# Patient Record
Sex: FEMALE | Race: BLACK OR AFRICAN AMERICAN
[De-identification: names, ages, dates, MRNs, and addresses within clinical notes are randomized per-mention and may not be internally consistent; named-entity substitution may affect disease eponyms.]

---

## 2021-10-07 ENCOUNTER — HOSPITAL ENCOUNTER (EMERGENCY)
Dept: HOSPITAL 76 - ED | Age: 21
Discharge: HOME | End: 2021-10-07
Payer: COMMERCIAL

## 2021-10-07 VITALS — DIASTOLIC BLOOD PRESSURE: 69 MMHG | SYSTOLIC BLOOD PRESSURE: 132 MMHG

## 2021-10-07 DIAGNOSIS — R55: Primary | ICD-10-CM

## 2021-10-07 LAB
ALBUMIN DIAFP-MCNC: 4.8 G/DL (ref 3.2–5.5)
ALBUMIN/GLOB SERPL: 1.5 {RATIO} (ref 1–2.2)
ALP SERPL-CCNC: 65 IU/L (ref 42–121)
ALT SERPL W P-5'-P-CCNC: 14 IU/L (ref 10–60)
ANION GAP SERPL CALCULATED.4IONS-SCNC: 11 MMOL/L (ref 6–13)
AST SERPL W P-5'-P-CCNC: 24 IU/L (ref 10–42)
BASOPHILS NFR BLD AUTO: 0 10^3/UL (ref 0–0.1)
BASOPHILS NFR BLD AUTO: 0.4 %
BILIRUB BLD-MCNC: 0.7 MG/DL (ref 0.2–1)
BUN SERPL-MCNC: 9 MG/DL (ref 6–20)
CALCIUM UR-MCNC: 9.9 MG/DL (ref 8.5–10.3)
CHLORIDE SERPL-SCNC: 102 MMOL/L (ref 101–111)
CLARITY UR REFRACT.AUTO: CLEAR
CO2 SERPL-SCNC: 25 MMOL/L (ref 21–32)
CREAT SERPLBLD-SCNC: 0.8 MG/DL (ref 0.4–1)
EOSINOPHIL # BLD AUTO: 0 10^3/UL (ref 0–0.7)
EOSINOPHIL NFR BLD AUTO: 0.1 %
ERYTHROCYTE [DISTWIDTH] IN BLOOD BY AUTOMATED COUNT: 13.4 % (ref 12–15)
GFRSERPLBLD MDRD-ARVRAT: 110 ML/MIN/{1.73_M2} (ref 89–?)
GLOBULIN SER-MCNC: 3.1 G/DL (ref 2.1–4.2)
GLUCOSE SERPL-MCNC: 85 MG/DL (ref 70–100)
GLUCOSE UR QL STRIP.AUTO: NEGATIVE MG/DL
HCG UR QL: NEGATIVE
HCT VFR BLD AUTO: 41.2 % (ref 37–47)
HGB UR QL STRIP: 13.2 G/DL (ref 12–16)
KETONES UR QL STRIP.AUTO: 15 MG/DL
LIPASE SERPL-CCNC: 28 U/L (ref 22–51)
LYMPHOCYTES # SPEC AUTO: 0.9 10^3/UL (ref 1.5–3.5)
LYMPHOCYTES NFR BLD AUTO: 9.4 %
MCH RBC QN AUTO: 27.8 PG (ref 27–31)
MCHC RBC AUTO-ENTMCNC: 32 G/DL (ref 32–36)
MCV RBC AUTO: 86.9 FL (ref 81–99)
MONOCYTES # BLD AUTO: 0.4 10^3/UL (ref 0–1)
MONOCYTES NFR BLD AUTO: 4.2 %
NEUTROPHILS # BLD AUTO: 8.2 10^3/UL (ref 1.5–6.6)
NEUTROPHILS # SNV AUTO: 9.6 X10^3/UL (ref 4.8–10.8)
NEUTROPHILS NFR BLD AUTO: 85.6 %
NITRITE UR QL STRIP.AUTO: NEGATIVE
NRBC # BLD AUTO: 0 /100WBC
NRBC # BLD AUTO: 0 X10^3/UL
PDW BLD AUTO: 10.1 FL (ref 7.9–10.8)
PH UR STRIP.AUTO: 5 PH (ref 5–7.5)
PLATELET # BLD: 332 10^3/UL (ref 130–450)
POTASSIUM SERPL-SCNC: 4 MMOL/L (ref 3.5–5)
PROT SPEC-MCNC: 7.9 G/DL (ref 6.7–8.2)
PROT UR STRIP.AUTO-MCNC: NEGATIVE MG/DL
RBC # UR STRIP.AUTO: (no result) /UL
RBC # URNS HPF: (no result) /HPF (ref 0–5)
RBC MAR: 4.74 10^6/UL (ref 4.2–5.4)
SODIUM SERPLBLD-SCNC: 138 MMOL/L (ref 135–145)
SP GR UR STRIP.AUTO: 1.01 (ref 1–1.03)
SQUAMOUS URNS QL MICRO: (no result)
UROBILINOGEN UR QL STRIP.AUTO: (no result) E.U./DL
UROBILINOGEN UR STRIP.AUTO-MCNC: NEGATIVE MG/DL
WBC # UR MANUAL: (no result) /HPF (ref 0–5)

## 2021-10-07 PROCEDURE — 93005 ELECTROCARDIOGRAM TRACING: CPT

## 2021-10-07 PROCEDURE — 81001 URINALYSIS AUTO W/SCOPE: CPT

## 2021-10-07 PROCEDURE — 36415 COLL VENOUS BLD VENIPUNCTURE: CPT

## 2021-10-07 PROCEDURE — 81003 URINALYSIS AUTO W/O SCOPE: CPT

## 2021-10-07 PROCEDURE — 99282 EMERGENCY DEPT VISIT SF MDM: CPT

## 2021-10-07 PROCEDURE — 80053 COMPREHEN METABOLIC PANEL: CPT

## 2021-10-07 PROCEDURE — 81025 URINE PREGNANCY TEST: CPT

## 2021-10-07 PROCEDURE — 87086 URINE CULTURE/COLONY COUNT: CPT

## 2021-10-07 PROCEDURE — 99284 EMERGENCY DEPT VISIT MOD MDM: CPT

## 2021-10-07 PROCEDURE — 85025 COMPLETE CBC W/AUTO DIFF WBC: CPT

## 2021-10-07 PROCEDURE — 83690 ASSAY OF LIPASE: CPT

## 2021-10-07 PROCEDURE — 84484 ASSAY OF TROPONIN QUANT: CPT

## 2021-10-07 NOTE — ED PHYSICIAN DOCUMENTATION
PD HPI SYNCOPE





- Stated complaint


Stated Complaint: LOSS OF CONSCOUSNESS





- Chief complaint


Chief Complaint: Neuro





- History obtained from


History obtained from: Patient





- History of Present Illness


Witnessed: Witnessed


Timing - onset: Today


Duration: Seconds


Preceding symptoms: Vision changes, Light headed, Generalized weakness


Associated symptoms: Vision changes.  No: Seizure, Incontinant of urine, 

Incontinant of stool, Headache


Contributing factors: Exertion


Injury occurred: None


Similar symptoms before: Has not had sx before


Recently seen: Not recently seen





- Additional information


Additional information: 





Previously well 20-year-old female was doing her physical activity today in 

training at the [employer], doing some rowing, when she began to feel some li

ghtheadedness and dizziness, like she was going to pass out. This improved and 

she continued to work out. When she finished her workout she felt again like she

might have some near syncope. She was able to get into her car and drive home 

and on the way home she began to feel like she was going to pass out again. She 

pulled over the side of the road. She got on the telephone with someone, that 

person told her that she seemed to pass out 3 or 4 times and the patient has no 

recollection of this.  She denies any seizure activity she denies any injury 

associated with this.  She has not had any chest pain.





Review of Systems


Constitutional: denies: Fever


Eyes: denies: Decreased vision


Ears: denies: Ear pain


Nose: denies: Congestion


Throat: denies: Sore throat


Cardiac: denies: Chest pain / pressure


Respiratory: denies: Dyspnea, Cough


GI: denies: Abdominal Pain, Nausea, Vomiting


: denies: Dysuria, Frequency


Skin: denies: Rash


Musculoskeletal: denies: Neck pain, Back pain, Extremity pain


Neurologic: reports: Near syncope.  denies: Generalized weakness, Focal 

weakness, Numbness, Difficulty speaking, Headache, Head injury, LOC





PD PAST MEDICAL HISTORY





- Allergies


Allergies/Adverse Reactions: 


                                    Allergies











Allergy/AdvReac Type Severity Reaction Status Date / Time


 


No Known Drug Allergies Allergy   Verified 10/07/21 09:20














PD ED PE NORMAL





- Vitals


Vital signs reviewed: Yes (normal )





- General


General: Alert and oriented X 3, No acute distress, Well developed/nourished





- HEENT


HEENT: Atraumatic, PERRL, EOMI





- Neck


Neck: Supple, no meningeal sign, No bony TTP





- Cardiac


Cardiac: RRR, No murmur





- Respiratory


Respiratory: No respiratory distress, Clear bilaterally





- Abdomen


Abdomen: Normal bowel sounds, Soft, Non tender, Non distended, No organomegaly





- Back


Back: No CVA TTP, No spinal TTP





- Derm


Derm: Normal color, Warm and dry, No rash





- Extremities


Extremities: No deformity, No edema





- Neuro


Neuro: Alert and oriented X 3, CNs 2-12 intact, No motor deficit, No sensory 

deficit, Normal speech


Eye Opening: Spontaneous


Motor: Obeys Commands


Verbal: Oriented


GCS Score: 15





- Psych


Psych: Normal mood, Normal affect





Results





- Vitals


Vitals: 


                               Vital Signs - 24 hr











  10/07/21





  09:15


 


Temperature 36.3 C L


 


Heart Rate 90


 


Respiratory 17





Rate 


 


Blood Pressure 132/69 H


 


O2 Saturation 99








                                     Oxygen











O2 Source                      Room air

















- EKG (time done)


  ** 1108


Rate: Rate (enter#) (68)


Rhythm: NSR


Ischemia: Normal ST segments


Compare to prior EKG: Old EKG unavailable


Computer interpretation: Agree with computer





- Labs


Labs: 


                                Laboratory Tests











  10/07/21 10/07/21 10/07/21





  10:49 10:49 10:49


 


WBC  9.6  


 


RBC  4.74  


 


Hgb  13.2  


 


Hct  41.2  


 


MCV  86.9  


 


MCH  27.8  


 


MCHC  32.0  


 


RDW  13.4  


 


Plt Count  332  


 


MPV  10.1  


 


Neut # (Auto)  8.2 H  


 


Lymph # (Auto)  0.9 L  


 


Mono # (Auto)  0.4  


 


Eos # (Auto)  0.0  


 


Baso # (Auto)  0.0  


 


Absolute Nucleated RBC  0.00  


 


Nucleated RBC %  0.0  


 


Sodium   138 


 


Potassium   4.0 


 


Chloride   102 


 


Carbon Dioxide   25 


 


Anion Gap   11.0 


 


BUN   9 


 


Creatinine   0.8 


 


Estimated GFR (MDRD)   110 


 


Glucose   85 


 


Calcium   9.9 


 


Total Bilirubin   0.7 


 


AST   24 


 


ALT   14 


 


Alkaline Phosphatase   65 


 


Troponin I High Sens    12.6


 


Total Protein   7.9 


 


Albumin   4.8 


 


Globulin   3.1 


 


Albumin/Globulin Ratio   1.5 


 


Lipase   28 


 


Urine Color   


 


Urine Clarity   


 


Urine pH   


 


Ur Specific Gravity   


 


Urine Protein   


 


Urine Glucose (UA)   


 


Urine Ketones   


 


Urine Occult Blood   


 


Urine Nitrite   


 


Urine Bilirubin   


 


Urine Urobilinogen   


 


Ur Leukocyte Esterase   


 


Urine RBC   


 


Urine WBC   


 


Ur Squamous Epith Cells   


 


Urine Bacteria   


 


Ur Microscopic Review   


 


Urine Culture Comments   


 


Urine HCG, Qual   














  10/07/21





  12:49


 


WBC 


 


RBC 


 


Hgb 


 


Hct 


 


MCV 


 


MCH 


 


MCHC 


 


RDW 


 


Plt Count 


 


MPV 


 


Neut # (Auto) 


 


Lymph # (Auto) 


 


Mono # (Auto) 


 


Eos # (Auto) 


 


Baso # (Auto) 


 


Absolute Nucleated RBC 


 


Nucleated RBC % 


 


Sodium 


 


Potassium 


 


Chloride 


 


Carbon Dioxide 


 


Anion Gap 


 


BUN 


 


Creatinine 


 


Estimated GFR (MDRD) 


 


Glucose 


 


Calcium 


 


Total Bilirubin 


 


AST 


 


ALT 


 


Alkaline Phosphatase 


 


Troponin I High Sens 


 


Total Protein 


 


Albumin 


 


Globulin 


 


Albumin/Globulin Ratio 


 


Lipase 


 


Urine Color  YELLOW


 


Urine Clarity  CLEAR


 


Urine pH  5.0


 


Ur Specific Gravity  1.015


 


Urine Protein  NEGATIVE


 


Urine Glucose (UA)  NEGATIVE


 


Urine Ketones  15 H


 


Urine Occult Blood  MODERATE H


 


Urine Nitrite  NEGATIVE


 


Urine Bilirubin  NEGATIVE


 


Urine Urobilinogen  0.2 (NORMAL)


 


Ur Leukocyte Esterase  NEGATIVE


 


Urine RBC  0-5


 


Urine WBC  0-3


 


Ur Squamous Epith Cells  RARE Squamous


 


Urine Bacteria  Rare


 


Ur Microscopic Review  INDICATED


 


Urine Culture Comments  NOT INDICATED


 


Urine HCG, Qual  NEGATIVE














- Rads (name of study)


  ** chest


Radiology: Prelim report reviewed (Impression: 1.  No acute cardiopulmonary 

abnormality.), EMP read indepedently, See rad report





Procedures





- IVC sono (time)


  ** 1030


Bedside IVC sono: IVC measures (cm) (1.51), Euvolemia





PD MEDICAL DECISION MAKING





- ED course


Complexity details: reviewed results, re-evaluated patient, considered 

differential, d/w patient


ED course: 





20-year-old female with acute near syncope after physical exertion is found to 

be euvolemic on interrogation of the inferior vena cava she has normal blood 

indices normal electrolytes normal troponin normal up appearing 

electrocardiogram and chest x-ray. She is not pregnant does look like she is 

starting her menses.





Departure





- Departure


Disposition: 01 Home, Self Care


Clinical Impression: 


Syncope


Qualifiers:


 Syncope type: unspecified Qualified Code(s): R55 - Syncope and collapse





Condition: Stable


Instructions:  ED Syncope Vasovagal


Follow-Up: 


[facility] [place] [Provider Group]

## 2021-10-07 NOTE — XRAY REPORT
PROCEDURE:  Chest 1 View X-Ray

 

INDICATIONS:  chest pain

 

TECHNIQUE:  One view of the chest was acquired.  

 

COMPARISON:  None available.

 

 

FINDINGS: 

SUPPORT DEVICES: None.

LUNG/PLEURA: No focal consolidation or pulmonary edema. No pleural effusion or space-occupying pneumo
thorax.

MEDIASTINUM: The cardiomediastinal silhouette is within normal limits.

BONES/SOFT TISSUES: No acute abnormality.

 

IMPRESSION: 

1.No acute cardiopulmonary abnormality.

 

Reviewed by: Gigi Leigh MD on 10/7/2021 10:58 AM PDT

Approved by: Gigi Leigh MD on 10/7/2021 10:58 AM PDT

 

 

Station ID:  [id number]

## 2022-10-17 ENCOUNTER — HOSPITAL ENCOUNTER (OUTPATIENT)
Dept: HOSPITAL 76 - DI | Age: 22
Discharge: HOME | End: 2022-10-17
Attending: HEALTH CARE PROVIDER
Payer: COMMERCIAL

## 2022-10-17 DIAGNOSIS — M22.2X2: Primary | ICD-10-CM

## 2022-10-17 NOTE — MRI REPORT
PROCEDURE:  KNEE WO - LT

 

INDICATIONS:  KNEE PAIN

 

TECHNIQUE:  

Noncontrast sagittal PD fast spin echo and T2 fast spin echo with fat saturation, sagittal 3-D spoile
d GE with fat saturation; coronal T1 spin echo and PD fast spin echo with fat saturation, and axial P
D fast spin echo with fat saturation through the knee.  

 

COMPARISON:  None.

 

FINDINGS:  

Image quality:  Excellent.  

 

Anterior cruciate ligament:  Intact.

Posterior cruciate ligament:  Intact.

Medial collateral ligament:  Intact.

Lateral collateral ligament:  Intact.

 

Medial meniscus:  Intact.

Lateral meniscus:  Intact.

 

Medial and lateral tendons:  The semimembranosus tendon insertions appear intact.  Visualized portion
s of the pes anserinus tendons appear normal.  The popliteus tendon appears intact.  Iliotibial band 
appears normal.  

 

Anterior structures:  There is mild patella micki. The distal quadriceps tendon is intact. No patellar
 subluxation. No femoral trochlear dysplasia or ventral trochlear prominence. Mild soft tissue edema 
is seen at the superolateral aspect of Hoffa's fat pad, which can be seen in the setting of lateral f
emoral condyle-patellar tendon friction syndrome. The tibial tubercle trochlear groove distance is wi
thin normal limits.

 

Bones:  No acute trabecular bone injury or fracture.

Medial femorotibial cartilage:  Intact.

Lateral femorotibial cartilage:  Intact.

Patellofemoral cartilage:  Intact.

 

Soft tissues:  There is a physiologic amount of joint fluid.  There is a trace medial popliteal cyst.
 The musculature surrounding the knee is normal in bulk.

 

IMPRESSION:  

1.Mild patella micki. Mild edema at the superolateral aspect of the infrapatellar fat pad can be seen 
in the setting of lateral femoral condyle-patellar tendon friction syndrome.

 

2.Cruciate and collateral ligaments are intact. No acute trabecular bone injury. No meniscal tear or 
focal cartilage defect.

 

 

Reviewed by: Michael Vegas MD on 10/17/2022 4:39 PM PDT

Approved by: Michael Vegas MD on 10/17/2022 4:39 PM PDT

 

 

Station ID:  [id number]